# Patient Record
Sex: FEMALE
[De-identification: names, ages, dates, MRNs, and addresses within clinical notes are randomized per-mention and may not be internally consistent; named-entity substitution may affect disease eponyms.]

---

## 2023-06-30 ENCOUNTER — NURSE TRIAGE (OUTPATIENT)
Dept: OTHER | Facility: CLINIC | Age: 28
End: 2023-06-30

## 2023-07-03 ENCOUNTER — NURSE TRIAGE (OUTPATIENT)
Dept: OTHER | Facility: CLINIC | Age: 28
End: 2023-07-03

## 2023-07-04 NOTE — TELEPHONE ENCOUNTER
Location of patient: [place]    Subjective: Caller states \"HR elevated at 126\"     Current Symptoms:  now,  10 minutes ago    Onset: 1 day ago; gradual    Associated Symptoms: NA    Pain Severity: denies    Temperature: denies    What has been tried: nothing    LMP: NA Pregnant: No    Recommended disposition: See PCP within 3 Days    Care advice provided, patient verbalizes understanding; denies any other questions or concerns; instructed to call back for any new or worsening symptoms. Patient/caller agrees to follow-up with PCP     This triage is a result of a call to [facility] brittany Nurse. Please do not respond to the triage nurse through this encounter. Any subsequent communication should be directly with the patient.       Reason for Disposition   [1] Palpitations AND [2] no improvement after using Care Advice    Protocols used: Heart Rate and Heartbeat Questions-ADULT-

## 2023-07-07 ENCOUNTER — NURSE TRIAGE (OUTPATIENT)
Dept: OTHER | Facility: CLINIC | Age: 28
End: 2023-07-07

## 2023-07-07 NOTE — TELEPHONE ENCOUNTER
Location of patient: [facility]    Subjective: Caller states \"that she has had a sore throat for the last 2 hrs\"     Current Symptoms: sore throat    Onset: 2 hours ago; sudden    Associated Symptoms: NA    Pain Severity: 1/10; squeezing; n/a    Temperature: denies fever at this time   N/a    What has been tried: nothing    LMP: NA Pregnant: NA    Recommended disposition: Home Care    Care advice provided, patient verbalizes understanding; denies any other questions or concerns; instructed to call back for any new or worsening symptoms. Patient / caller agrees to disposition. This triage is a result of a call to [address] [place] [address]. Please do not respond to the triage nurse through this encounter. Any subsequent communication should be directly with the patient.       Reason for Disposition   [1] Sore throat is the only symptom AND [2] sore throat present < 48 hours    Protocols used: Sore Throat-ADULT-

## 2023-07-25 ENCOUNTER — NURSE TRIAGE (OUTPATIENT)
Dept: OTHER | Facility: CLINIC | Age: 28
End: 2023-07-25

## 2023-07-26 NOTE — TELEPHONE ENCOUNTER
Location of patient: [place]    Subjective: Caller states dizziness, new prescription for Buspar, feels off balance when standing and feels like gate is weird. Palpitations, resolves within 30 seconds when sits down. Took new medication 1 hour ago, symptoms started 10 minutes ago. Current Symptoms: lightheadedness and palpitations    Onset: 10 minutes ago; intermittent    Associated Symptoms: NA    Pain Severity: 0/10; N/A; none    Temperature: no fever reported by unknown method    What has been tried: called nurse line    LMP: NA Pregnant: NA    Recommended disposition:  Call PCP or Prescriber's office now for on call support, if unavailable call a 24hr pharmacist or call Poison Control , if symptoms worsen or unable to get answers for cause of symptoms may consider going to the ED for evaluation. Care advice provided, patient verbalizes understanding; denies any other questions or concerns; instructed to call back for any new or worsening symptoms. Will call prescriber for possible on call support. This triage is a result of a call to [facility] Nurse. Please do not respond to the triage nurse through this encounter. Any subsequent communication should be directly with the patient.     Reason for Disposition   [1] Caller has URGENT medicine question about med that PCP or specialist prescribed AND [2] triager unable to answer question    Protocols used: Medication Question Call-ADULT-

## 2023-08-03 ENCOUNTER — NURSE TRIAGE (OUTPATIENT)
Dept: OTHER | Facility: CLINIC | Age: 28
End: 2023-08-03

## 2023-08-03 NOTE — TELEPHONE ENCOUNTER
Location of patient: [place]    Subjective: Caller states \"I have been having chest pains and a feeling like something is stuck in my throat\"     Current Symptoms: Chest pains, feels like something is stuck in my throat, changes in stooling    Onset: 3 days ago; gradual    Associated Symptoms: irritability , fussiness    Pain Severity: 0/10; N/A; none    Temperature: Denies fever     What has been tried: Nothing    Recommended disposition: See PCP within 3 Days    Care advice provided, patient verbalizes understanding; denies any other questions or concerns; instructed to call back for any new or worsening symptoms. Patient/caller agrees to follow-up with PCP     This triage is a result of a call to [facility] Nurse. Please do not respond to the triage nurse through this encounter. Any subsequent communication should be directly with the patient. Reason for Disposition   MODERATE anxiety (e.g., persistent or frequent anxiety symptoms; interferes with sleep, school, or work)    Protocols used:  Anxiety and Panic Attack-ADULT-

## 2023-08-04 NOTE — TELEPHONE ENCOUNTER
Location of patient: [place]    Subjective: Caller states \"I started Lexapro today and my HR just went up\"     Current Symptoms: First dose of Lexapro today and felt tingling all over, and HR went to 113 at rest, now at 97. Took 10 mg dose at 7 pm. No dizziness, no weakness, no SOB. Has left sided chest pain now that is more of \"discomfort\". /71. HR 88. Tingling has gone. Onset: 15  minutes ago; gradual    Associated Symptoms: NA    Pain Severity: 2/10; sharp; constant    Temperature: n/a     What has been tried: n/a    LMP: NA Pregnant: NA    Recommended disposition: See PCP within 24 Hours    Care advice provided, patient verbalizes understanding; denies any other questions or concerns; instructed to call back for any new or worsening symptoms. Patient/caller agrees to follow-up with PCP     This triage is a result of a call to [facility] Nurse. Please do not respond to the triage nurse through this encounter. Any subsequent communication should be directly with the patient.       Reason for Disposition   [1] Chest pain lasts < 5 minutes AND [2] NO chest pain or cardiac symptoms (e.g., breathing difficulty, sweating) now  (Exception: Chest pains that last only a few seconds.)    Protocols used: Chest Pain-ADULT-

## 2023-08-07 ENCOUNTER — NURSE TRIAGE (OUTPATIENT)
Dept: OTHER | Facility: CLINIC | Age: 28
End: 2023-08-07

## 2023-08-07 NOTE — TELEPHONE ENCOUNTER
Location of patient: [place]    Subjective: Caller states \"I am wondering if I am having a reaction to my Buspar\"     Current Symptoms: Started taking Buspar twice a day yesterday and was only on it as needed prior to. Tongue and mouth feel tingly. No trouble breathing or swallowing. Onset: 15 minutes  ago;     Pain Severity:tingling; constant    LMP: NA Pregnant: NA    Recommended disposition: Go to ED Now    Care advice provided, patient verbalizes understanding; denies any other questions or concerns; instructed to call back for any new or worsening symptoms. Patient/caller agrees to proceed to [facility] Emergency Department    This triage is a result of a call to [facility] Nurse. Please do not respond to the triage nurse through this encounter. Any subsequent communication should be directly with the patient.     Reason for Disposition   [1] Caller has URGENT medicine question about med that PCP or specialist prescribed AND [2] triager unable to answer question    Protocols used: Medication Question Call-ADULT-

## 2023-08-21 ENCOUNTER — NURSE TRIAGE (OUTPATIENT)
Dept: OTHER | Facility: CLINIC | Age: 28
End: 2023-08-21

## 2023-08-21 NOTE — TELEPHONE ENCOUNTER
Location of patient: [place]    Subjective: Caller states \"SOB\"     Current Symptoms: SOB with and without activity. Had new medication when in ED earlier. Denies wheezing, chest pain, trouble swallowing. Onset: 30  minutes ago; worsening    Associated Symptoms: reduced activity    Pain Severity: 0/10; N/A; none    Temperature: none \    What has been tried: nothing    LMP: NA Pregnant: No    Recommended disposition: See HCP within 4 Hours (or PCP triage)    Care advice provided, patient verbalizes understanding; denies any other questions or concerns; instructed to call back for any new or worsening symptoms. Patient/caller agrees to proceed to nearest [facility]     This triage is a result of a call to [facility] Nurse. Please do not respond to the triage nurse through this encounter. Any subsequent communication should be directly with the patient.         Reason for Disposition   [1] MILD difficulty breathing (e.g., minimal/no SOB at rest, SOB with walking, pulse <100) AND [2] NEW-onset or WORSE than normal    Protocols used: Breathing Difficulty-ADULT-

## 2023-08-21 NOTE — TELEPHONE ENCOUNTER
Location of patient: [place]    Subjective: Caller states \"I have an icy hot feeling in my legs - shins and elbows to wrists. \"     Woke up around 00:30 am, anxious, panic attack. I just felt it. Current Symptoms: icy hot feeling to both forearms and shins. Denies swelling, rash, redness, fever, chest pain, SOB, dizzy, weakness    Onset:   1.5 hours ago; Has never felt this sensation before. Taking a new anxiety medication. Taken 3rd dose. Associated Symptoms: NA    Pain Severity: /NA    Temperature: Denies     What has been tried: Nothing    LMP: NA Pregnant: NA    Recommended disposition: [facility] advice provided, patient verbalizes understanding; denies any other questions or concerns; instructed to call back for any new or worsening symptoms. Monitor and if symptoms per sist, worsen, or develop new symptoms, call back or make appointment with your provider. [facility]. Please do not respond to the triage nurse through this encounter. Any subsequent communication should be directly with the patient.       Reason for Disposition   [1] Numbness or tingling on both sides of body AND [2] is a new symptom present < 24 hours    Protocols used: Neurologic Deficit-ADULT-

## 2023-08-22 NOTE — TELEPHONE ENCOUNTER
Location of patient: [place]    Subjective: Caller states \"Takes Viibrid and augmentin for strep slepts 6 hours in the last two days. Exhausted, anxiety issues, had a panic thing that happened, went to the ED. Difficulty going to sleep. Wanting to know if Melatonin would help. Current Symptoms: insomnia    Onset: 2 daysago; gradual    Associated Symptoms: NA    Pain Severity: 0/10 pain    Temperature: no fever \"feel warm not feverish\" by unknown method    What has been tried: sleeping    Recommended disposition: [facility] advice provided, patient verbalizes understanding; denies any other questions or concerns; instructed to call back for any new or worsening symptoms. Will follow up with PCP or 24hr Pharmacy for recommendation on safety of taking Melatonin with prescribed medications. This triage is a result of a call to [facility] Nurse. Please do not respond to the triage nurse through this encounter. Any subsequent communication should be directly with the patient. Reason for Disposition   Difficulty falling to sleep or staying asleep    Protocols used:  Insomnia-ADULT-

## 2023-09-11 ENCOUNTER — NURSE TRIAGE (OUTPATIENT)
Dept: OTHER | Facility: CLINIC | Age: 28
End: 2023-09-11

## 2023-09-11 NOTE — TELEPHONE ENCOUNTER
Location of patient: [place]    Subjective: Caller states \"A couple days ago started feeling sick with coughing all night, runny nose, congestion coughing up yellow mucus. Kids were sick not sure with what though. \"     Current Symptoms: chest pain, throat pain    Denies covid, flu, light headedness, faint    Onset: 2 days ago;     Pain Severity: 1/10; only when coughing    Temperature: denies       Recommended disposition: Go to ED/[facility] Now (Or to Office with PCP Approval)    Care advice provided, patient verbalizes understanding; denies any other questions or concerns; instructed to call back for any new or worsening symptoms. Patient/caller agrees to proceed to nearest [facility]     This triage is a result of a call to [facility] Nurse. Please do not respond to the triage nurse through this encounter. Any subsequent communication should be directly with the patient.     Reason for Disposition   [1] MILD difficulty breathing (e.g., minimal/no SOB at rest, SOB with walking, pulse <100) AND [2] still present when not coughing    Protocols used: Cough - Acute Productive-ADULT-